# Patient Record
Sex: FEMALE | Race: WHITE | Employment: OTHER | URBAN - METROPOLITAN AREA
[De-identification: names, ages, dates, MRNs, and addresses within clinical notes are randomized per-mention and may not be internally consistent; named-entity substitution may affect disease eponyms.]

---

## 2021-03-30 ENCOUNTER — HOSPITAL ENCOUNTER (OUTPATIENT)
Dept: CARDIAC REHAB | Age: 71
Discharge: HOME | End: 2021-03-30
Payer: MEDICARE

## 2021-03-30 VITALS — WEIGHT: 187.8 LBS | HEIGHT: 61 IN | BODY MASS INDEX: 35.45 KG/M2

## 2021-03-30 PROCEDURE — 93798 PHYS/QHP OP CAR RHAB W/ECG: CPT

## 2021-03-30 RX ORDER — LISINOPRIL 5 MG/1
5 TABLET ORAL DAILY
COMMUNITY

## 2021-03-30 RX ORDER — ACETAMINOPHEN 325 MG/1
650 TABLET ORAL
COMMUNITY

## 2021-03-30 RX ORDER — EZETIMIBE 10 MG/1
10 TABLET ORAL DAILY
COMMUNITY

## 2021-03-30 RX ORDER — AMPICILLIN TRIHYDRATE 250 MG
600 CAPSULE ORAL 2 TIMES DAILY
COMMUNITY

## 2021-03-30 RX ORDER — AMIODARONE HYDROCHLORIDE 200 MG/1
200 TABLET ORAL 2 TIMES DAILY
COMMUNITY
End: 2021-04-29

## 2021-03-30 RX ORDER — GLUCOSAMINE SULFATE 1500 MG
1000 POWDER IN PACKET (EA) ORAL DAILY
COMMUNITY

## 2021-03-30 RX ORDER — CHOLECALCIFEROL (VITAMIN D3) 125 MCG
1000 CAPSULE ORAL DAILY
COMMUNITY
End: 2021-03-30

## 2021-03-30 RX ORDER — METOPROLOL TARTRATE 25 MG/1
25 TABLET, FILM COATED ORAL 2 TIMES DAILY
COMMUNITY
End: 2021-04-29

## 2021-04-02 ENCOUNTER — HOSPITAL ENCOUNTER (OUTPATIENT)
Dept: CARDIAC REHAB | Age: 71
Discharge: HOME | End: 2021-04-02
Payer: MEDICARE

## 2021-04-02 VITALS — BODY MASS INDEX: 35.98 KG/M2 | WEIGHT: 190.4 LBS

## 2021-04-02 PROCEDURE — 93798 PHYS/QHP OP CAR RHAB W/ECG: CPT

## 2021-04-07 ENCOUNTER — HOSPITAL ENCOUNTER (OUTPATIENT)
Dept: CARDIAC REHAB | Age: 71
Discharge: HOME | End: 2021-04-07
Payer: MEDICARE

## 2021-04-07 VITALS — WEIGHT: 185 LBS | BODY MASS INDEX: 34.96 KG/M2

## 2021-04-07 PROCEDURE — 93798 PHYS/QHP OP CAR RHAB W/ECG: CPT

## 2021-04-09 ENCOUNTER — HOSPITAL ENCOUNTER (OUTPATIENT)
Dept: CARDIAC REHAB | Age: 71
Discharge: HOME | End: 2021-04-09
Payer: MEDICARE

## 2021-04-09 VITALS — BODY MASS INDEX: 35.14 KG/M2 | WEIGHT: 186 LBS

## 2021-04-09 PROCEDURE — 93798 PHYS/QHP OP CAR RHAB W/ECG: CPT

## 2021-04-13 ENCOUNTER — APPOINTMENT (OUTPATIENT)
Dept: CARDIAC REHAB | Age: 71
End: 2021-04-13
Payer: MEDICARE

## 2021-04-15 ENCOUNTER — HOSPITAL ENCOUNTER (OUTPATIENT)
Dept: CARDIAC REHAB | Age: 71
Discharge: HOME | End: 2021-04-15
Payer: MEDICARE

## 2021-04-15 VITALS — WEIGHT: 183 LBS | BODY MASS INDEX: 34.58 KG/M2

## 2021-04-15 PROCEDURE — 93798 PHYS/QHP OP CAR RHAB W/ECG: CPT

## 2021-04-21 ENCOUNTER — HOSPITAL ENCOUNTER (OUTPATIENT)
Dept: CARDIAC REHAB | Age: 71
Discharge: HOME | End: 2021-04-21
Payer: MEDICARE

## 2021-04-21 VITALS — WEIGHT: 182.7 LBS | BODY MASS INDEX: 34.52 KG/M2

## 2021-04-21 PROCEDURE — 93798 PHYS/QHP OP CAR RHAB W/ECG: CPT

## 2021-04-21 PROCEDURE — 93797 PHYS/QHP OP CAR RHAB WO ECG: CPT | Performed by: DIETITIAN, REGISTERED

## 2021-04-21 NOTE — CARDIO/PULMONARY
Cardiac Rehab Nutrition Assessment - 1:1 Evaluation NAME: Martita Howard : 1950 AGE: 72 y.o. GENDER: female CARDIAC REHAB ADMITTING DIAGNOSIS: CABGx3 Relevant Comorbidites: diabetes, hypertension, dyslipidemia and afib LABS:  
No results found for: HBA1C, HGBE8, YVA4MSPZ, HHS5KWHZ No results found for: CHOL, CHOLPOCT, CHOLX, CHLST, CHOLV, HDL, HDLPOC, HDLP, LDL, LDLCPOC, LDLC, DLDLP, VLDLC, VLDL, TGLX, TRIGL, TRIGP, TGLPOCT, CHHD, CHHDX Per pt 21 A1c 6.9; she SMBG 3x/day; FBG today 101 MEDICATIONS/SUPPLEMENTS:  
[unfilled] Prior to Admission medications Medication Sig Start Date End Date Taking? Authorizing Provider  
rivaroxaban (Xarelto) 20 mg tab tablet Take 20 mg by mouth daily (with dinner). Indications: prevention of thromboembolism in paroxysmal atrial fibrillation    Provider, Historical  
ezetimibe (ZETIA) 10 mg tablet Take 10 mg by mouth daily. Indications: excessive fat in the blood    Provider, Historical  
amiodarone (CORDARONE) 200 mg tablet Take 200 mg by mouth two (2) times a day. Indications: prevention of post cardio-thoracic surgery atrial fibrillation    Provider, Historical  
lisinopriL (PRINIVIL, ZESTRIL) 5 mg tablet Take 5 mg by mouth daily. Indications: high blood pressure    Provider, Historical  
metoprolol tartrate (LOPRESSOR) 25 mg tablet Take 25 mg by mouth two (2) times a day. Indications: high blood pressure    Provider, Historical  
acetaminophen (TYLENOL) 325 mg tablet Take 650 mg by mouth every six (6) hours as needed for Pain. Indications: pain    Provider, Historical  
red yeast rice extract 600 mg cap Take 600 mg by mouth two (2) times a day. Indications: excessive fat in the blood    Provider, Historical  
cholecalciferol (VITAMIN D3) 25 mcg (1,000 unit) cap Take 1,000 Units by mouth daily.  Indications: prevention of vitamin D deficiency    Provider, Historical  
metFORMIN (GLUCOPHAGE) 500 mg tablet Take 500 mg by mouth two (2) times daily (with meals). He Bauman MD  
levothyroxine (LEVOTHROID) 100 mcg tablet Take 88 mcg by mouth Daily (before breakfast). Indications: a condition with low thyroid hormone levels    He Bauman MD  
omeprazole (PRILOSEC) 40 mg capsule Take 20 mg by mouth daily. He Bauman MD  
aspirin 81 mg tablet Take 81 mg by mouth daily. He Bauman MD  
glimepiride (AMARYL) 2 mg tablet Take 2 mg by mouth two (2) times a day. He Bauman MD  
furosemide (LASIX) 40 mg tablet Take 40 mg by mouth daily as needed. He Bauman MD  
 
 
 
 
ANTHROPOMETRICS:   
Ht Readings from Last 1 Encounters:  
03/30/21 5' 1\" (1.549 m) Wt Readings from Last 10 Encounters:  
04/15/21 83 kg (183 lb) 04/09/21 84.4 kg (186 lb) 04/07/21 83.9 kg (185 lb) 04/02/21 86.4 kg (190 lb 6.4 oz) 03/30/21 85.2 kg (187 lb 12.8 oz) 12/30/12 83.9 kg (185 lb) IBW:105 # +/- 10%  %IBW: 174% +/- 10% BMI: 34.6 kg/M2 Category: obese Waist: 49 inches Reported Wt Hx: weight 194 lbs prior to CABG Reported Diet Hx: 
 
Rate Your Plate Score: 52 (Score 58-72: Making many healthy choices; 41-57: Some choices need improving 24-40: many choices need improving) 24 HOUR DIET RECALL Breakfast Made an egg sandwich but it fell in the floor so she didn't eat it Lunch A little roast beef, squash casserole, steamed broccoli Dinner 2 small waffles with jam & unsalted butter (not very hungry last night) Snacks A few no salt potato chips with homemade dill dip (no salt) Beverages water Alexandriajoanne Valentine states she has been reading labels for carbs and sugars and is now reading & comparing labels for sodium also. She uses low carb bread. She eats eggs most every morning, but today had [place] yogurt. States she sometimes gets tired of what she can eat as well as trying to figure out what to eat. Has some difficulty digesting wheat, gives her abdominal pain and diarrhea.  
 
Environmental/Social: Nirali Monterroso is  and lives alone but is close to her children & grandchildren; Sweetie Matias does her own cooking; Sweetie Matias is retired. NUTRITION INTERVENTION: 
Nutrition 60 minute one-on-one education & goal setting with Donna Denis Reviewed with Donna Denis relevant labs compared to ideals. Reviewed weight history and patient's verbalized weight goal as well as any real or perceived barriers to obtaining the goal. Collaborated with patient to set a specific short and long term weight goal.  
 
Reviewed Rate Your Plate and conducted a verbal diet recall. Assessed for environmental, financial, psychosocial, physical and comorbidities that may impact the food and eating patterns / behaviors of Donna Denis Collaborated with patient to set specific nutrient goals as well as specific food / behavior changes that will help patient meet the overall goal of following a heart healthy eating pattern (using guidelines as set forth by the American Heart Association and modeled after healthful eating patterns as recognized by the USDA Dietary Guidelines such as DASH, Mediterranean or plant-based). Briefly reviewed with Donna Denis the nutrition information in the Cardiac Rehab patient education book and encouraged Donna Denis to read thoroughly, ask questions as needed, and use for future reference for heart healthy nutrition information. Donna Denis is not scheduled to participate in Cardiac Rehab group nutrition classes. PATIENT GOALS: 
 
Weight Goals: 
Short Term Weight Goal:175 lbs Long Term Weight Goal:140-160 lbs Nutrition Goals: 
Daily Recommendations: 
Calories: 2349-7555 /day 
(using [address] with AF 1.3; deducting 300-500 for weight loss) Saturated Fat: no more than 8-9 g/day Trans Fat: 0 g/day Sodium: no more than 1500 mg/day Fruit: 1-1.5 cups / day Vegetables: 1.5-2 cups/day or more Low / No Fat Dairy: 2 cups/day Other: 
- read and compare food labels - DASH plan 
- reduce amount of butter or switch to tub spread (eg Summer Bravo) - try Two Good brand for yogurt (lower carb) and corn tortillas for wraps (low sodium & carb) Keeping a food diary was recommended. Questions addressed. Follow-up plans discussed. Paul Merrill verbalized understanding.  
 
        Shaw Dickey, HEATHER

## 2021-04-22 ENCOUNTER — APPOINTMENT (OUTPATIENT)
Dept: CARDIAC REHAB | Age: 71
End: 2021-04-22
Payer: MEDICARE

## 2021-04-27 ENCOUNTER — HOSPITAL ENCOUNTER (OUTPATIENT)
Dept: CARDIAC REHAB | Age: 71
Discharge: HOME | End: 2021-04-27
Payer: MEDICARE

## 2021-04-27 VITALS — WEIGHT: 182 LBS | BODY MASS INDEX: 34.39 KG/M2

## 2021-04-27 PROCEDURE — 93798 PHYS/QHP OP CAR RHAB W/ECG: CPT

## 2021-04-29 ENCOUNTER — HOSPITAL ENCOUNTER (OUTPATIENT)
Dept: CARDIAC REHAB | Age: 71
Discharge: HOME | End: 2021-04-29
Payer: MEDICARE

## 2021-04-29 VITALS — WEIGHT: 181.6 LBS | BODY MASS INDEX: 34.31 KG/M2

## 2021-04-29 RX ORDER — BISOPROLOL FUMARATE AND HYDROCHLOROTHIAZIDE 5; 6.25 MG/1; MG/1
1 TABLET ORAL DAILY
COMMUNITY

## 2021-04-29 NOTE — CARDIO/PULMONARY
[facility] Cardiopulmonary Rehab: Pt reported to cardiac rehab today with elevated /83 with HR 60 after resting for 14 minutes. Pt reported she saw Dr Av Bustillos yesterday and the following med changes were made:   Discontinued Amiodarone, Metoprolol, and Aspirin. Started Bisoprolol-HCTZ 5-6.25 daily in morning and Lisinopril in evening. Pt rested for additional 8 minutes and BP readings did not improve: /77 with HR 55. Advised pt that sBP can be no higher than 170 in order to exercise. Pt verbalized understanding. FAX to Dr Av Bustillos requesting copy of office note and advising of elevated BP.

## 2021-05-05 ENCOUNTER — HOSPITAL ENCOUNTER (OUTPATIENT)
Dept: CARDIAC REHAB | Age: 71
Discharge: HOME | End: 2021-05-05
Payer: MEDICARE

## 2021-05-05 VITALS — BODY MASS INDEX: 34.43 KG/M2 | WEIGHT: 182.2 LBS

## 2021-05-05 PROCEDURE — 93798 PHYS/QHP OP CAR RHAB W/ECG: CPT

## 2021-05-06 ENCOUNTER — APPOINTMENT (OUTPATIENT)
Dept: CARDIAC REHAB | Age: 71
End: 2021-05-06
Payer: MEDICARE

## 2021-05-11 ENCOUNTER — HOSPITAL ENCOUNTER (OUTPATIENT)
Dept: CARDIAC REHAB | Age: 71
Discharge: HOME | End: 2021-05-11
Payer: MEDICARE

## 2021-05-11 VITALS — BODY MASS INDEX: 34.58 KG/M2 | WEIGHT: 183 LBS

## 2021-05-11 PROCEDURE — 93798 PHYS/QHP OP CAR RHAB W/ECG: CPT

## 2021-05-13 ENCOUNTER — HOSPITAL ENCOUNTER (OUTPATIENT)
Dept: CARDIAC REHAB | Age: 71
Discharge: HOME | End: 2021-05-13
Payer: MEDICARE

## 2021-05-13 NOTE — CARDIO/PULMONARY
[facility] Cardiopulmonary Rehab:  Shayna Uriostegui reported for exercise session. However, her BP was elevated >170/90, and she was not able to exercise today. Encouraged pt to contact her cardiologist and provided record of BP readings taken [id number]. Pt reported she took BP meds at 0830. Pt c/o cough on lisinopril. Pt also reported she has been using a wrist BP cuff at home. Advised pt to use arm BP cuff for improved accuracy. BP READINGS: 
After resting 5-10 min, initial BP by Dinamap in Lt arm: 187/79 with HR 54. After 5 min more rest, manual BP taken by RN in Rt arm: 190/80, HR 57. After 5 min more rest, manual BP taken in Rt arm: 188/80, HR 56.

## 2021-06-02 ENCOUNTER — HOSPITAL ENCOUNTER (OUTPATIENT)
Dept: CARDIAC REHAB | Age: 71
Discharge: HOME | End: 2021-06-02
Payer: MEDICARE

## 2021-06-02 VITALS — WEIGHT: 183 LBS | BODY MASS INDEX: 34.58 KG/M2

## 2021-06-02 PROCEDURE — 93798 PHYS/QHP OP CAR RHAB W/ECG: CPT

## 2021-06-03 ENCOUNTER — HOSPITAL ENCOUNTER (OUTPATIENT)
Dept: CARDIAC REHAB | Age: 71
Discharge: HOME | End: 2021-06-03
Payer: MEDICARE

## 2021-06-03 VITALS — BODY MASS INDEX: 34.75 KG/M2 | WEIGHT: 183.9 LBS

## 2021-06-03 PROCEDURE — 93798 PHYS/QHP OP CAR RHAB W/ECG: CPT

## 2021-06-09 ENCOUNTER — HOSPITAL ENCOUNTER (OUTPATIENT)
Dept: CARDIAC REHAB | Age: 71
Discharge: HOME | End: 2021-06-09
Payer: MEDICARE

## 2021-06-09 VITALS — BODY MASS INDEX: 34.77 KG/M2 | WEIGHT: 184 LBS

## 2021-06-09 PROCEDURE — 93798 PHYS/QHP OP CAR RHAB W/ECG: CPT

## 2021-06-10 ENCOUNTER — HOSPITAL ENCOUNTER (OUTPATIENT)
Dept: CARDIAC REHAB | Age: 71
Discharge: HOME | End: 2021-06-10
Payer: MEDICARE

## 2021-06-10 VITALS — WEIGHT: 183 LBS | BODY MASS INDEX: 34.58 KG/M2

## 2021-06-10 PROCEDURE — 93798 PHYS/QHP OP CAR RHAB W/ECG: CPT

## 2021-06-16 ENCOUNTER — APPOINTMENT (OUTPATIENT)
Dept: CARDIAC REHAB | Age: 71
End: 2021-06-16
Payer: MEDICARE

## 2021-06-17 ENCOUNTER — APPOINTMENT (OUTPATIENT)
Dept: CARDIAC REHAB | Age: 71
End: 2021-06-17
Payer: MEDICARE

## 2021-09-21 NOTE — CARDIO/PULMONARY
[address] Cardiac Rehab-Discharge Note  Pt admitted to Phase 2 Cardiac Rehab on 3/30/21. Pt attended 13 exercise and nutrition sessions. Some visits, pt did not exercise due to elevated blood pressure. Communication with pt and pt's cardiologist throughout pt's participation regarding blood pressure elevation. Medications were adjusted. Due to COVID-19 Delta variant, [facility] discontinued the [employer]. Pt felt she would not be able to park her car and walk into the building. No exercise sessions were scheduled after 6/10/21. Cardiac Rehab PSR spoke to patient many times. It was agreed for pt to no longer continue with the program.  N.  [employer]

## 2023-05-21 RX ORDER — GLIMEPIRIDE 2 MG/1
2 TABLET ORAL 2 TIMES DAILY
COMMUNITY

## 2023-05-21 RX ORDER — EZETIMIBE 10 MG/1
10 TABLET ORAL DAILY
COMMUNITY

## 2023-05-21 RX ORDER — CRANBERRY FRUIT EXTRACT 200 MG
600 CAPSULE ORAL 2 TIMES DAILY
COMMUNITY

## 2023-05-21 RX ORDER — OMEPRAZOLE 40 MG/1
20 CAPSULE, DELAYED RELEASE ORAL DAILY
COMMUNITY

## 2023-05-21 RX ORDER — ACETAMINOPHEN 325 MG/1
650 TABLET ORAL EVERY 6 HOURS PRN
COMMUNITY

## 2023-05-21 RX ORDER — BISOPROLOL FUMARATE AND HYDROCHLOROTHIAZIDE 5; 6.25 MG/1; MG/1
1 TABLET ORAL DAILY
COMMUNITY

## 2023-05-21 RX ORDER — LEVOTHYROXINE SODIUM 0.1 MG/1
88 TABLET ORAL
COMMUNITY

## 2023-05-21 RX ORDER — LISINOPRIL 5 MG/1
5 TABLET ORAL DAILY
COMMUNITY

## 2023-10-09 ENCOUNTER — TRANSCRIBE ORDERS (OUTPATIENT)
Facility: HOSPITAL | Age: 73
End: 2023-10-09

## 2023-10-09 DIAGNOSIS — Z12.31 VISIT FOR SCREENING MAMMOGRAM: Primary | ICD-10-CM

## 2023-10-19 ENCOUNTER — HOSPITAL ENCOUNTER (OUTPATIENT)
Facility: HOSPITAL | Age: 73
Discharge: HOME | End: 2023-10-19
Payer: MEDICARE

## 2023-10-19 DIAGNOSIS — Z12.31 VISIT FOR SCREENING MAMMOGRAM: ICD-10-CM

## 2023-10-19 PROCEDURE — 77063 BREAST TOMOSYNTHESIS BI: CPT

## 2024-05-03 RX ORDER — LEVOTHYROXINE SODIUM 0.07 MG/1
75 TABLET ORAL
COMMUNITY

## 2024-05-03 RX ORDER — LEVOTHYROXINE SODIUM 88 UG/1
88 TABLET ORAL
COMMUNITY

## 2024-05-03 RX ORDER — METFORMIN HYDROCHLORIDE 500 MG/1
500 TABLET, EXTENDED RELEASE ORAL 2 TIMES DAILY
COMMUNITY

## 2024-05-03 RX ORDER — INSULIN GLARGINE 300 U/ML
26 INJECTION, SOLUTION SUBCUTANEOUS EVERY EVENING
COMMUNITY

## 2024-05-03 RX ORDER — DICYCLOMINE HYDROCHLORIDE 10 MG/1
20 CAPSULE ORAL 3 TIMES DAILY PRN
COMMUNITY

## 2024-05-03 RX ORDER — BISOPROLOL FUMARATE 10 MG/1
10 TABLET, FILM COATED ORAL DAILY
COMMUNITY

## 2024-05-03 RX ORDER — ROSUVASTATIN CALCIUM 10 MG/1
10 TABLET, COATED ORAL DAILY
COMMUNITY

## 2024-05-03 RX ORDER — ASPIRIN 81 MG/1
81 TABLET ORAL DAILY
COMMUNITY

## 2024-05-03 RX ORDER — SPIRONOLACTONE 25 MG/1
25 TABLET ORAL DAILY
COMMUNITY

## 2024-05-03 RX ORDER — LOSARTAN POTASSIUM 100 MG/1
100 TABLET ORAL DAILY
COMMUNITY

## 2024-05-03 RX ORDER — HYDROCHLOROTHIAZIDE 25 MG/1
25 TABLET ORAL DAILY
COMMUNITY

## 2024-05-03 NOTE — FLOWSHEET NOTE
[facility]  Endoscopy Preprocedure Instructions      1. On the day of your surgery, please report to registration located on the 2nd floor of the  the Main Hospital. yes    2. You must have a responsible adult to drive you to the hospital, stay at the hospital during your procedure and drive you home. If they leave your procedure will not be started (no exceptions). yes    3. Do not have anything to eat or drink (including water, gum, mints, coffee, and juice) after midnight. This does not apply to the medications you were instructed to take by your physician.yes  If you are currently taking Plavix, Coumadin, Aspirin, or other blood-thinning agents, contact your physician for special instructions. yes,aspirin    4. If you are having a procedure that requires bowel prep: We recommend that you have only clear liquids the day before your procedure and begin your bowel prep by 5:00 pm.  You may continue to drink clear liquids until midnight.  If for any reason you are not able to complete your prep please notify your physician immediately. yes    5. Have a list of all current medications, including vitamins, herbal supplements and any other over the counter medications. Reviewed over the phone    6. If you wear glasses, contacts, dentures and/or hearing aids, they may be removed prior to procedure, please bring a case to store them in. yes    7. You should understand that if you do not follow these instructions your procedure may be cancelled.  If your physical condition changes (I.e. fever, cold or flu) please contact your doctor as soon as possible.    8. It is important that you be on time.  If for any reason you are unable to keep your appointment please call [phone] the day of or your physician’s office prior to your scheduled procedure    9. Have you received your COVID Vaccine? no If no, you will need to receive a COVID test/swab here at [facility] the [facility] parking lot Monday - Friday 8a -

## 2024-07-23 ENCOUNTER — ANESTHESIA EVENT (OUTPATIENT)
Facility: HOSPITAL | Age: 74
End: 2024-07-23
Payer: MEDICARE

## 2024-07-23 ENCOUNTER — HOSPITAL ENCOUNTER (OUTPATIENT)
Facility: HOSPITAL | Age: 74
Setting detail: OUTPATIENT SURGERY
Discharge: HOME | End: 2024-07-23
Attending: SPECIALIST | Admitting: SPECIALIST
Payer: MEDICARE

## 2024-07-23 ENCOUNTER — ANESTHESIA (OUTPATIENT)
Facility: HOSPITAL | Age: 74
End: 2024-07-23
Payer: MEDICARE

## 2024-07-23 VITALS
WEIGHT: 195.11 LBS | OXYGEN SATURATION: 97 % | HEIGHT: 61 IN | SYSTOLIC BLOOD PRESSURE: 111 MMHG | BODY MASS INDEX: 36.84 KG/M2 | DIASTOLIC BLOOD PRESSURE: 58 MMHG | HEART RATE: 56 BPM | RESPIRATION RATE: 15 BRPM | TEMPERATURE: 97.9 F

## 2024-07-23 LAB
GLUCOSE BLD STRIP.AUTO-MCNC: 224 MG/DL (ref 65–117)
SERVICE CMNT-IMP: ABNORMAL

## 2024-07-23 PROCEDURE — 3600007502: Performed by: SPECIALIST

## 2024-07-23 PROCEDURE — 2580000003 HC RX 258: Performed by: NURSE ANESTHETIST, CERTIFIED REGISTERED

## 2024-07-23 PROCEDURE — 6360000002 HC RX W HCPCS: Performed by: NURSE ANESTHETIST, CERTIFIED REGISTERED

## 2024-07-23 PROCEDURE — 82962 GLUCOSE BLOOD TEST: CPT

## 2024-07-23 PROCEDURE — 88305 TISSUE EXAM BY PATHOLOGIST: CPT

## 2024-07-23 PROCEDURE — 7100000011 HC PHASE II RECOVERY - ADDTL 15 MIN: Performed by: SPECIALIST

## 2024-07-23 PROCEDURE — 7100000010 HC PHASE II RECOVERY - FIRST 15 MIN: Performed by: SPECIALIST

## 2024-07-23 PROCEDURE — 2500000003 HC RX 250 WO HCPCS: Performed by: NURSE ANESTHETIST, CERTIFIED REGISTERED

## 2024-07-23 PROCEDURE — 3700000000 HC ANESTHESIA ATTENDED CARE: Performed by: SPECIALIST

## 2024-07-23 PROCEDURE — 3600007512: Performed by: SPECIALIST

## 2024-07-23 PROCEDURE — 3700000001 HC ADD 15 MINUTES (ANESTHESIA): Performed by: SPECIALIST

## 2024-07-23 RX ORDER — SODIUM CHLORIDE 9 MG/ML
INJECTION, SOLUTION INTRAVENOUS CONTINUOUS
Status: DISCONTINUED | OUTPATIENT
Start: 2024-07-23 | End: 2024-07-23 | Stop reason: HOSPADM

## 2024-07-23 RX ORDER — SIMETHICONE 40MG/0.6ML
40 SUSPENSION, DROPS(FINAL DOSAGE FORM)(ML) ORAL AS NEEDED
Status: DISCONTINUED | OUTPATIENT
Start: 2024-07-23 | End: 2024-07-23 | Stop reason: HOSPADM

## 2024-07-23 RX ORDER — SODIUM CHLORIDE 9 MG/ML
INJECTION, SOLUTION INTRAVENOUS CONTINUOUS PRN
Status: DISCONTINUED | OUTPATIENT
Start: 2024-07-23 | End: 2024-07-23 | Stop reason: SDUPTHER

## 2024-07-23 RX ORDER — DEXMEDETOMIDINE HYDROCHLORIDE 100 UG/ML
INJECTION, SOLUTION INTRAVENOUS PRN
Status: DISCONTINUED | OUTPATIENT
Start: 2024-07-23 | End: 2024-07-23 | Stop reason: SDUPTHER

## 2024-07-23 RX ORDER — LIDOCAINE HCL/PF 100 MG/5ML
SYRINGE (ML) INJECTION PRN
Status: DISCONTINUED | OUTPATIENT
Start: 2024-07-23 | End: 2024-07-23 | Stop reason: SDUPTHER

## 2024-07-23 RX ORDER — SODIUM CHLORIDE 0.9 % (FLUSH) 0.9 %
5-40 SYRINGE (ML) INJECTION PRN
Status: DISCONTINUED | OUTPATIENT
Start: 2024-07-23 | End: 2024-07-23 | Stop reason: HOSPADM

## 2024-07-23 RX ORDER — PROPOFOL 10 MG/ML
INJECTION, EMULSION INTRAVENOUS PRN
Status: DISCONTINUED | OUTPATIENT
Start: 2024-07-23 | End: 2024-07-23 | Stop reason: SDUPTHER

## 2024-07-23 RX ADMIN — PROPOFOL 50 MG: 10 INJECTION, EMULSION INTRAVENOUS at 08:07

## 2024-07-23 RX ADMIN — PROPOFOL 120 MCG/KG/MIN: 10 INJECTION, EMULSION INTRAVENOUS at 08:08

## 2024-07-23 RX ADMIN — LIDOCAINE HYDROCHLORIDE 40 MG: 20 INJECTION INTRAVENOUS at 08:16

## 2024-07-23 RX ADMIN — PROPOFOL 30 MG: 10 INJECTION, EMULSION INTRAVENOUS at 08:16

## 2024-07-23 RX ADMIN — DEXMEDETOMIDINE 10 MCG: 100 INJECTION, SOLUTION, CONCENTRATE INTRAVENOUS at 08:05

## 2024-07-23 RX ADMIN — LIDOCAINE HYDROCHLORIDE 60 MG: 20 INJECTION INTRAVENOUS at 08:07

## 2024-07-23 RX ADMIN — SODIUM CHLORIDE: 9 INJECTION, SOLUTION INTRAVENOUS at 07:30

## 2024-07-23 NOTE — OP NOTE
[facility]  [facility]  Grant Amor MD  [phone]      2024    Colonoscopy Procedure Note  Rahel Daniel  :  1950  Joanna Medical Record Number: [id number]    Indications:     Personal history of colon polyps (screening only)  PCP:  Mihai Whipple MD  Anesthesia/Sedation: Conscious Sedation/Moderate Sedation/GETA, see notes  Endoscopist:  Dr. Grant Amor  Complications:  None  Estimated Blood Loss:  None    Permit:  The indications, risks, benefits and alternatives were reviewed with the patient or their decision maker who was provided an opportunity to ask questions and all questions were answered.  The specific risks of colonoscopy with conscious sedation were reviewed, including but not limited to anesthetic complication, bleeding, adverse drug reaction, missed lesion, infection, IV site reactions, and intestinal perforation which would lead to the need for surgical repair.  Alternatives to colonoscopy including radiographic imaging, observation without testing, or laboratory testing were reviewed including the limitations of those alternatives.  After considering the options and having all their questions answered, the patient or their decision maker provided both verbal and written consent to proceed.        Procedure in Detail:  After obtaining informed consent, positioning of the patient in the left lateral decubitus position, and conduction of a pre-procedure pause or \"time out\" the endoscope was introduced into the anus and advanced to the cecum, which was identified by the ileocecal valve and appendiceal orifice.  The quality of the colonic preparation was good.  A careful inspection was made as the colonoscope was withdrawn, findings and interventions are described below.    Findings:   3mm and 6mm transverse polyps removed with cold snare, retrieved, and hemostasis confirmed.    Specimens:

## 2024-07-23 NOTE — H&P
73 y.o. female for open access colonoscopy for screening   Additional data for completion of the targeted pre-endoscopy H&P will be provided under 'H&P interval notes'.  Please see that document which will be attached to this.  Grant Amor MD    Last dwayne 2016 adenoma, recall was for 2021 but... covid.  
   Smokeless tobacco: Never    Tobacco comments:     Smoked in high school only   Substance Use Topics    Alcohol use: Not Currently     Comment: little as teenager      Family History   Problem Relation Age of Onset    Cerebral Aneurysm Mother         brain aneurysm    Heart Attack Mother     Other Mother         back surgery    Kidney Disease Mother         dialysis only after back surgery    Diabetes Mother     Lung Cancer Father       Allergies   Allergen Reactions    Latex Rash    Codeine Hives    Penicillins Hives      Prior to Admission Medications   Prescriptions Last Dose Informant Patient Reported? Taking?   Acetaminophen (TYLENOL ARTHRITIS PAIN PO) 7/22/2024  Yes Yes   Sig: Take 650-1,300 mg by mouth as needed (up to a total of 3 per day if needed)   Insulin Lispro (HUMALOG PEN SC) 7/22/2024  Yes Yes   Sig: Inject into the skin 3-4 times daily when eating depending on what she eats. Is usually 4-12 units each time she eats.   aspirin 81 MG EC tablet 7/22/2024  Yes Yes   Sig: Take 1 tablet by mouth daily   bisoprolol (ZEBETA) 10 MG tablet 7/23/2024  Yes Yes   Sig: Take 1 tablet by mouth daily   dicyclomine (BENTYL) 10 MG capsule Past Month  Yes Yes   Sig: Take 2 capsules by mouth 3 times daily as needed   ezetimibe (ZETIA) 10 MG tablet 7/22/2024  Yes No   Sig: Take 1 tablet by mouth daily   hydroCHLOROthiazide (HYDRODIURIL) 25 MG tablet Not Taking  Yes Yes   Sig: Take 1 tablet by mouth daily   Patient not taking: Reported on 7/23/2024   insulin glargine, 1 unit dial, (TOUJEO SOLOSTAR) 300 UNIT/ML concentrated injection pen 7/22/2024  Yes Yes   Sig: Inject 26 Units into the skin every evening   levothyroxine (SYNTHROID) 75 MCG tablet 7/21/2024  Yes Yes   Sig: Take 1 tablet by mouth Takes 75 mcg Sunday, Tues, Thurs, Sat.   levothyroxine (SYNTHROID) 88 MCG tablet 7/22/2024  Yes Yes   Sig: Take 1 tablet by mouth Every Monday, Wednesday, and Friday   losartan (COZAAR) 100 MG tablet 7/23/2024  Yes Yes   Sig:

## 2024-07-23 NOTE — ANESTHESIA POSTPROCEDURE EVALUATION
Department of Anesthesiology  Postprocedure Note    Patient: Rahel Daniel  MRN: [id number]  YOB: 1950  Date of evaluation: 7/23/2024    Procedure Summary       Date: 07/23/24 Room / Location: [place] [address] / [facility] ENDOSCOPY    Anesthesia Start: 0803 Anesthesia Stop: 0824    Procedures:       COLONOSCOPY      COLONOSCOPY POLYPECTOMY SNARE/BIOPSY (Lower GI Region) Diagnosis:       Personal history of colonic polyps      (Personal history of colonic polyps [Z86.010])    Surgeons: Grant Amor MD Responsible Provider: Maegan Kate MD    Anesthesia Type: MAC ASA Status: 3            Anesthesia Type: No value filed.    Uriel Phase I: Uriel Score: 10    Uriel Phase II: Uriel Score: 10    Anesthesia Post Evaluation    Patient location during evaluation: PACU  Patient participation: complete - patient participated  Level of consciousness: awake  Airway patency: patent  Nausea & Vomiting: no vomiting and no nausea  Cardiovascular status: hemodynamically stable  Respiratory status: acceptable  Hydration status: stable  Pain management: adequate    No notable events documented.

## 2024-07-23 NOTE — PERIOP NOTE
Bedside and Verbal shift change report given to JAKY Valdez RN (oncoming nurse) by RYAN Walters RN (offgoing nurse). Report included the following information Nurse Handoff Report, Index, Adult Overview, Surgery Report, Intake/Output, MAR, Recent Results, Med Rec Status, Cardiac Rhythm NSR, and Neuro Assessment.

## 2024-07-23 NOTE — DISCHARGE INSTRUCTIONS
[facility]  [facility]  Grant Amor MD  [phone]      July 23, 2024    Rahel Daniel  YOB: 1950    COLONOSCOPY DISCHARGE INSTRUCTIONS    If there is redness at IV site you should apply warm compress to area.  If redness or soreness persist contact Dr. Amor' or your primary care doctor.    There may be a slight amount of blood passed from the rectum.  Gaseous discomfort may develop, but walking, belching will help relieve this.  You may not operate a vehicle for 12 hours  You may not operate machinery or dangerous appliances for rest of today  You may not drink alcoholic beverages for 12 hours  Avoid making any critical decisions for 24 hours    DIET:  You may resume your normal diet, but some patients find that heavy or large meals may lead to indigestion or vomiting.  I suggest a light meal as first food intake.    MEDICATIONS:  The use of some over-the-counter pain medication may lead to bleeding after colon biopsies or polyp removal.  Tylenol (also called acetaminophen) is safe to take even if you have had colonoscopy with polyp removal.  Based on the procedure you had today you may not safely take aspirin or aspirin-like products for the next ten (10) days.  Remember that Tylenol (also called acetaminophen) is safe to take after colonoscopy even if you have had biopsies or polyps removed.    ACTIVITY:  You may resume your normal household activities, but it is recommended that you spend the remainder of the day resting -  avoid any strenuous activity.    CALL DR. AMOR' OFFICE IF:  Increasing pain, nausea, vomiting  Abdominal distension (swelling)  Significant new or increased bleeding (oral or rectal)  Fever/Chills  Chest pain/shortness of breath                       Additional instructions:   Great news, no colon cancer but we did remove two small polyps and I'll contact you with those results in 10-14 days

## 2024-07-23 NOTE — ANESTHESIA PRE PROCEDURE
HISTORY      left thumb surgery since it would not bend per pt    RETINAL DETACHMENT SURGERY Bilateral     TONSILLECTOMY      in 20's       Social History:    Social History     Tobacco Use    Smoking status: Former     Types: Cigarettes    Smokeless tobacco: Never    Tobacco comments:     Smoked in high school only   Substance Use Topics    Alcohol use: Not Currently     Comment: little as teenager                                Counseling given: Not Answered  Tobacco comments: Smoked in high school only      Vital Signs (Current): There were no vitals filed for this visit.                                           BP Readings from Last 3 Encounters:   No data found for BP       NPO Status: Time of last liquid consumption: 2150                        Time of last solid consumption: 1300                        Date of last liquid consumption: 07/22/24                        Date of last solid food consumption: 07/21/24    BMI:   Wt Readings from Last 3 Encounters:   No data found for Wt     There is no height or weight on file to calculate BMI.    CBC: No results found for: \"WBC\", \"RBC\", \"HGB\", \"HCT\", \"MCV\", \"RDW\", \"PLT\"    CMP: No results found for: \"NA\", \"K\", \"CL\", \"CO2\", \"BUN\", \"CREATININE\", \"GFRAA\", \"AGRATIO\", \"LABGLOM\", \"GLUCOSE\", \"GLU\", \"CALCIUM\", \"BILITOT\", \"ALKPHOS\", \"AST\", \"ALT\"    POC Tests: No results for input(s): \"POCGLU\", \"POCNA\", \"POCK\", \"POCCL\", \"POCBUN\", \"POCHEMO\", \"POCHCT\" in the last 72 hours.    Coags: No results found for: \"PROTIME\", \"INR\", \"APTT\"    HCG (If Applicable): No results found for: \"PREGTESTUR\", \"PREGSERUM\", \"HCG\", \"HCGQUANT\"     ABGs: No results found for: \"PHART\", \"PO2ART\", \"VRY7PLL\", \"UKN3FNJ\", \"BEART\", \"A6RSAUUR\"     Type & Screen (If Applicable):  No results found for: \"LABABO\"    Drug/Infectious Status (If Applicable):  No results found for: \"HIV\", \"HEPCAB\"    COVID-19 Screening (If Applicable): No results found for: \"COVID19\"        Anesthesia Evaluation  Patient summary

## 2024-10-30 ENCOUNTER — HOSPITAL ENCOUNTER (OUTPATIENT)
Facility: HOSPITAL | Age: 74
Discharge: HOME | End: 2024-11-02
Payer: MEDICARE

## 2024-10-30 VITALS — WEIGHT: 195 LBS | BODY MASS INDEX: 36.84 KG/M2

## 2024-10-30 DIAGNOSIS — Z12.31 VISIT FOR SCREENING MAMMOGRAM: ICD-10-CM

## 2024-10-30 PROCEDURE — 77063 BREAST TOMOSYNTHESIS BI: CPT

## 2025-06-30 ENCOUNTER — HOSPITAL ENCOUNTER (OUTPATIENT)
Facility: HOSPITAL | Age: 75
Discharge: HOME | End: 2025-07-03
Attending: ORTHOPAEDIC SURGERY
Payer: MEDICARE

## 2025-06-30 ENCOUNTER — TRANSCRIBE ORDERS (OUTPATIENT)
Facility: HOSPITAL | Age: 75
End: 2025-06-30

## 2025-06-30 DIAGNOSIS — M79.89 CALF SWELLING: Primary | ICD-10-CM

## 2025-06-30 DIAGNOSIS — M79.89 CALF SWELLING: ICD-10-CM

## 2025-06-30 PROCEDURE — 93971 EXTREMITY STUDY: CPT
